# Patient Record
Sex: MALE | Race: WHITE | Employment: FULL TIME | URBAN - METROPOLITAN AREA
[De-identification: names, ages, dates, MRNs, and addresses within clinical notes are randomized per-mention and may not be internally consistent; named-entity substitution may affect disease eponyms.]

---

## 2020-12-10 ENCOUNTER — APPOINTMENT (OUTPATIENT)
Dept: GENERAL RADIOLOGY | Age: 30
End: 2020-12-10
Attending: EMERGENCY MEDICINE
Payer: COMMERCIAL

## 2020-12-10 ENCOUNTER — APPOINTMENT (OUTPATIENT)
Dept: CT IMAGING | Age: 30
End: 2020-12-10
Attending: EMERGENCY MEDICINE
Payer: COMMERCIAL

## 2020-12-10 ENCOUNTER — HOSPITAL ENCOUNTER (EMERGENCY)
Age: 30
Discharge: HOME | End: 2020-12-11
Attending: EMERGENCY MEDICINE
Payer: COMMERCIAL

## 2020-12-10 VITALS
HEIGHT: 70 IN | HEART RATE: 84 BPM | RESPIRATION RATE: 18 BRPM | OXYGEN SATURATION: 97 % | SYSTOLIC BLOOD PRESSURE: 114 MMHG | TEMPERATURE: 98 F | DIASTOLIC BLOOD PRESSURE: 80 MMHG | BODY MASS INDEX: 27.2 KG/M2 | WEIGHT: 190 LBS

## 2020-12-10 DIAGNOSIS — S05.02XA ABRASION OF LEFT CORNEA, INITIAL ENCOUNTER: ICD-10-CM
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
## 2020-12-10 DIAGNOSIS — S02.85XA CLOSED FRACTURE OF ORBIT, INITIAL ENCOUNTER (HCC): ICD-10-CM

## 2020-12-10 DIAGNOSIS — S01.511A LIP LACERATION, INITIAL ENCOUNTER: Primary | ICD-10-CM

## 2020-12-10 DIAGNOSIS — S63.105A THUMB DISLOCATION, LEFT, INITIAL ENCOUNTER: ICD-10-CM

## 2020-12-10 DIAGNOSIS — S02.2XXA CLOSED FRACTURE OF NASAL BONE, INITIAL ENCOUNTER: ICD-10-CM

## 2020-12-10 DIAGNOSIS — S02.92XA CLOSED FRACTURE OF FACIAL BONE, UNSPECIFIED FACIAL BONE, INITIAL ENCOUNTER (HCC): ICD-10-CM

## 2020-12-10 PROCEDURE — 75810000303 HC CLSD TRMT  FRACTURE/DISLOCATION W/  ANES

## 2020-12-10 PROCEDURE — 75810000293 HC SIMP/SUPERF WND  RPR

## 2020-12-10 PROCEDURE — 73130 X-RAY EXAM OF HAND: CPT

## 2020-12-10 PROCEDURE — 74011250636 HC RX REV CODE- 250/636: Performed by: EMERGENCY MEDICINE

## 2020-12-10 PROCEDURE — 72125 CT NECK SPINE W/O DYE: CPT

## 2020-12-10 PROCEDURE — 70486 CT MAXILLOFACIAL W/O DYE: CPT

## 2020-12-10 PROCEDURE — 74011000250 HC RX REV CODE- 250: Performed by: EMERGENCY MEDICINE

## 2020-12-10 PROCEDURE — 73560 X-RAY EXAM OF KNEE 1 OR 2: CPT

## 2020-12-10 PROCEDURE — 90471 IMMUNIZATION ADMIN: CPT

## 2020-12-10 PROCEDURE — 99284 EMERGENCY DEPT VISIT MOD MDM: CPT

## 2020-12-10 PROCEDURE — 90715 TDAP VACCINE 7 YRS/> IM: CPT | Performed by: EMERGENCY MEDICINE

## 2020-12-10 PROCEDURE — 70450 CT HEAD/BRAIN W/O DYE: CPT

## 2020-12-10 RX ORDER — PROPARACAINE HYDROCHLORIDE 5 MG/ML
1 SOLUTION/ DROPS OPHTHALMIC
Status: COMPLETED | OUTPATIENT
Start: 2020-12-10 | End: 2020-12-10

## 2020-12-10 RX ORDER — LIDOCAINE HYDROCHLORIDE 10 MG/ML
5 INJECTION, SOLUTION EPIDURAL; INFILTRATION; INTRACAUDAL; PERINEURAL ONCE
Status: COMPLETED | OUTPATIENT
Start: 2020-12-10 | End: 2020-12-10

## 2020-12-10 RX ADMIN — LIDOCAINE HYDROCHLORIDE 5 ML: 10 INJECTION, SOLUTION EPIDURAL; INFILTRATION; INTRACAUDAL; PERINEURAL at 23:23

## 2020-12-10 RX ADMIN — PROPARACAINE HYDROCHLORIDE 1 DROP: 5 SOLUTION/ DROPS OPHTHALMIC at 23:23

## 2020-12-10 RX ADMIN — FLUORESCEIN SODIUM 1 STRIP: 0.6 STRIP OPHTHALMIC at 23:23

## 2020-12-10 RX ADMIN — TETANUS TOXOID, REDUCED DIPHTHERIA TOXOID AND ACELLULAR PERTUSSIS VACCINE, ADSORBED 0.5 ML: 5; 2.5; 8; 8; 2.5 SUSPENSION INTRAMUSCULAR at 23:22

## 2020-12-11 PROCEDURE — 74011250637 HC RX REV CODE- 250/637: Performed by: EMERGENCY MEDICINE

## 2020-12-11 PROCEDURE — 75810000293 HC SIMP/SUPERF WND  RPR

## 2020-12-11 RX ORDER — PSEUDOEPHEDRINE HCL 30 MG
30 TABLET ORAL
Qty: 20 TAB | Refills: 0 | Status: SHIPPED | OUTPATIENT
Start: 2020-12-11

## 2020-12-11 RX ORDER — IBUPROFEN 600 MG/1
600 TABLET ORAL
Status: COMPLETED | OUTPATIENT
Start: 2020-12-11 | End: 2020-12-11

## 2020-12-11 RX ORDER — IBUPROFEN 600 MG/1
600 TABLET ORAL
Qty: 18 TAB | Refills: 0 | Status: SHIPPED | OUTPATIENT
Start: 2020-12-11

## 2020-12-11 RX ORDER — CEPHALEXIN 500 MG/1
500 CAPSULE ORAL 4 TIMES DAILY
Qty: 28 CAP | Refills: 0 | Status: SHIPPED | OUTPATIENT
Start: 2020-12-11 | End: 2020-12-18

## 2020-12-11 RX ORDER — HYDROCODONE BITARTRATE AND ACETAMINOPHEN 5; 325 MG/1; MG/1
1 TABLET ORAL
Qty: 14 TAB | Refills: 0 | Status: SHIPPED | OUTPATIENT
Start: 2020-12-11 | End: 2020-12-18

## 2020-12-11 RX ORDER — POLYMYXIN B SULFATE AND TRIMETHOPRIM 1; 10000 MG/ML; [USP'U]/ML
1 SOLUTION OPHTHALMIC EVERY 6 HOURS
Qty: 1 BOTTLE | Refills: 0 | Status: SHIPPED | OUTPATIENT
Start: 2020-12-11 | End: 2020-12-18

## 2020-12-11 RX ORDER — CEPHALEXIN 250 MG/1
500 CAPSULE ORAL
Status: COMPLETED | OUTPATIENT
Start: 2020-12-11 | End: 2020-12-11

## 2020-12-11 RX ADMIN — IBUPROFEN 600 MG: 600 TABLET, FILM COATED ORAL at 00:30

## 2020-12-11 RX ADMIN — CEPHALEXIN 500 MG: 250 CAPSULE ORAL at 02:42
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
## 2020-12-11 NOTE — ED NOTES
Patient requesting Tylenol or Ibuprofen; Provider informed.  HOB elevated and assisted patient with reposition for comfort,

## 2020-12-11 NOTE — DISCHARGE INSTRUCTIONS
Return for pain, decreased vision, fever not resolving with motrin or tylenol, shortness of breath, vomiting, decreased fluid intake, weakness, numbness, dizziness, or any change or concerns. Don't wear contact lens,  as we discussed.

## 2020-12-11 NOTE — ED NOTES
Patient updated on test results are still pending. Encouraged to voice any concerns, and all questions/concerns addressed. Frequent rounding provided to address any concerns. Offered comfort measures; warm blanket, reposition, dimmed lights. Patient denies any discomforts at this time. Call bell remains at bedside. Hourly rounding for comfort and pain control remains in progress.  Cold compresses changed to maintain effectivemness
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
## 2020-12-11 NOTE — ED NOTES
Wounds cleaned and irrigated with sterile NS per protocol. Patient tolerated well. HOB elevated. Denies current/up to date on Tetanus.  Admits to drinking alcohol PTA
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
